# Patient Record
Sex: FEMALE | Race: WHITE | Employment: OTHER | URBAN - METROPOLITAN AREA
[De-identification: names, ages, dates, MRNs, and addresses within clinical notes are randomized per-mention and may not be internally consistent; named-entity substitution may affect disease eponyms.]

---

## 2019-12-14 ENCOUNTER — HOSPITAL ENCOUNTER (EMERGENCY)
Age: 49
Discharge: HOME | End: 2019-12-14
Attending: EMERGENCY MEDICINE
Payer: COMMERCIAL

## 2019-12-14 VITALS
HEIGHT: 60 IN | SYSTOLIC BLOOD PRESSURE: 122 MMHG | DIASTOLIC BLOOD PRESSURE: 76 MMHG | OXYGEN SATURATION: 100 % | BODY MASS INDEX: 26.5 KG/M2 | RESPIRATION RATE: 16 BRPM | TEMPERATURE: 98.5 F | WEIGHT: 135 LBS | HEART RATE: 74 BPM

## 2019-12-14 DIAGNOSIS — E87.1 HYPONATREMIA: Primary | ICD-10-CM

## 2019-12-14 LAB
ALBUMIN SERPL-MCNC: 4.1 G/DL (ref 3.5–5.2)
ALP BLD-CCNC: 50 U/L (ref 35–104)
ALT SERPL-CCNC: 21 U/L (ref 0–32)
ANION GAP SERPL CALCULATED.3IONS-SCNC: 11 MMOL/L (ref 7–16)
AST SERPL-CCNC: 26 U/L (ref 0–31)
BACTERIA: NORMAL /HPF
BASOPHILS ABSOLUTE: 0 E9/L (ref 0–0.2)
BASOPHILS RELATIVE PERCENT: 0.3 % (ref 0–2)
BILIRUB SERPL-MCNC: 0.3 MG/DL (ref 0–1.2)
BILIRUBIN URINE: NEGATIVE
BLOOD, URINE: ABNORMAL
BUN BLDV-MCNC: 7 MG/DL (ref 6–20)
CALCIUM SERPL-MCNC: 8.8 MG/DL (ref 8.6–10.2)
CHLORIDE BLD-SCNC: 93 MMOL/L (ref 98–107)
CLARITY: CLEAR
CO2: 22 MMOL/L (ref 22–29)
COLOR: ABNORMAL
CREAT SERPL-MCNC: 0.6 MG/DL (ref 0.5–1)
EOSINOPHILS ABSOLUTE: 0 E9/L (ref 0.05–0.5)
EOSINOPHILS RELATIVE PERCENT: 0.1 % (ref 0–6)
EPITHELIAL CELLS, UA: NORMAL /HPF
GFR AFRICAN AMERICAN: >60
GFR NON-AFRICAN AMERICAN: >60 ML/MIN/1.73
GLUCOSE BLD-MCNC: 108 MG/DL (ref 74–99)
GLUCOSE URINE: NEGATIVE MG/DL
HCT VFR BLD CALC: 36.5 % (ref 34–48)
HEMOGLOBIN: 12.9 G/DL (ref 11.5–15.5)
KETONES, URINE: ABNORMAL MG/DL
LEUKOCYTE ESTERASE, URINE: NEGATIVE
LYMPHOCYTES ABSOLUTE: 0.46 E9/L (ref 1.5–4)
LYMPHOCYTES RELATIVE PERCENT: 6.1 % (ref 20–42)
MCH RBC QN AUTO: 31.8 PG (ref 26–35)
MCHC RBC AUTO-ENTMCNC: 35.3 % (ref 32–34.5)
MCV RBC AUTO: 89.9 FL (ref 80–99.9)
MONOCYTES ABSOLUTE: 0.3 E9/L (ref 0.1–0.95)
MONOCYTES RELATIVE PERCENT: 3.5 % (ref 2–12)
NEUTROPHILS ABSOLUTE: 6.84 E9/L (ref 1.8–7.3)
NEUTROPHILS RELATIVE PERCENT: 90.4 % (ref 43–80)
NITRITE, URINE: NEGATIVE
PDW BLD-RTO: 11.4 FL (ref 11.5–15)
PH UA: 5.5 (ref 5–9)
PLATELET # BLD: 211 E9/L (ref 130–450)
PMV BLD AUTO: 9.5 FL (ref 7–12)
POTASSIUM SERPL-SCNC: 3.9 MMOL/L (ref 3.5–5)
PROTEIN UA: NEGATIVE MG/DL
RBC # BLD: 4.06 E12/L (ref 3.5–5.5)
RBC UA: NORMAL /HPF (ref 0–2)
SODIUM BLD-SCNC: 126 MMOL/L (ref 132–146)
SPECIFIC GRAVITY UA: <=1.005 (ref 1–1.03)
TOTAL PROTEIN: 6.4 G/DL (ref 6.4–8.3)
TROPONIN: <0.01 NG/ML (ref 0–0.03)
TSH SERPL DL<=0.05 MIU/L-ACNC: 0.87 UIU/ML (ref 0.27–4.2)
UROBILINOGEN, URINE: 0.2 E.U./DL
WBC # BLD: 7.6 E9/L (ref 4.5–11.5)
WBC UA: NORMAL /HPF (ref 0–5)

## 2019-12-14 PROCEDURE — 93005 ELECTROCARDIOGRAM TRACING: CPT | Performed by: EMERGENCY MEDICINE

## 2019-12-14 PROCEDURE — 84484 ASSAY OF TROPONIN QUANT: CPT

## 2019-12-14 PROCEDURE — 84443 ASSAY THYROID STIM HORMONE: CPT

## 2019-12-14 PROCEDURE — 80053 COMPREHEN METABOLIC PANEL: CPT

## 2019-12-14 PROCEDURE — 36415 COLL VENOUS BLD VENIPUNCTURE: CPT

## 2019-12-14 PROCEDURE — 2580000003 HC RX 258: Performed by: EMERGENCY MEDICINE

## 2019-12-14 PROCEDURE — 85025 COMPLETE CBC W/AUTO DIFF WBC: CPT

## 2019-12-14 PROCEDURE — 99284 EMERGENCY DEPT VISIT MOD MDM: CPT

## 2019-12-14 PROCEDURE — 81001 URINALYSIS AUTO W/SCOPE: CPT

## 2019-12-14 RX ORDER — 0.9 % SODIUM CHLORIDE 0.9 %
1000 INTRAVENOUS SOLUTION INTRAVENOUS ONCE
Status: COMPLETED | OUTPATIENT
Start: 2019-12-14 | End: 2019-12-14

## 2019-12-14 RX ADMIN — SODIUM CHLORIDE 1000 ML: 9 INJECTION, SOLUTION INTRAVENOUS at 12:08

## 2019-12-15 LAB
EKG ATRIAL RATE: 76 BPM
EKG P AXIS: 65 DEGREES
EKG P-R INTERVAL: 140 MS
EKG Q-T INTERVAL: 382 MS
EKG QRS DURATION: 78 MS
EKG QTC CALCULATION (BAZETT): 429 MS
EKG R AXIS: 45 DEGREES
EKG T AXIS: 39 DEGREES
EKG VENTRICULAR RATE: 76 BPM

## 2019-12-15 PROCEDURE — 93010 ELECTROCARDIOGRAM REPORT: CPT | Performed by: INTERNAL MEDICINE

## 2021-09-27 ENCOUNTER — OFFICE VISIT (OUTPATIENT)
Dept: SPORTS MEDICINE | Age: 51
End: 2021-09-27
Payer: COMMERCIAL

## 2021-09-27 VITALS — BODY MASS INDEX: 27.88 KG/M2 | TEMPERATURE: 97.5 F | WEIGHT: 142 LBS | HEIGHT: 60 IN

## 2021-09-27 DIAGNOSIS — M94.0 COSTOCHONDRITIS, ACUTE: Primary | ICD-10-CM

## 2021-09-27 DIAGNOSIS — S29.011A STRAIN OF RIGHT PECTORALIS MUSCLE, INITIAL ENCOUNTER: ICD-10-CM

## 2021-09-27 PROCEDURE — 99214 OFFICE O/P EST MOD 30 MIN: CPT | Performed by: FAMILY MEDICINE

## 2021-09-27 RX ORDER — IBUPROFEN 800 MG/1
800 TABLET ORAL
Qty: 90 TABLET | Refills: 0 | Status: SHIPPED | OUTPATIENT
Start: 2021-09-27

## 2021-09-27 ASSESSMENT — ENCOUNTER SYMPTOMS
BACK PAIN: 0
CONSTIPATION: 0
NAUSEA: 0
DIARRHEA: 0
SHORTNESS OF BREATH: 0

## 2021-09-27 NOTE — PROGRESS NOTES
[facility]) Physicians  Neurosurgery and Pain [facility]  [address] [facility] [address], [address] [facility] , [address] [facility], [place]: [phone]  F: [phone]      Tc Beavers  (1970)    9/27/2021    Subjective:     Tc Beavers is 46 y.o. female who complains today of:    Chief Complaint   Patient presents with    Other     upper right chest       HPI     Patient comes in complaining of right upper chest pain which he thinks started after doing a new exercise couple weeks ago she states is feeling about the same she has tried 200 mg ibuprofen without much help she is doing some basic exercises that are helping either  Allergies:  Amoxicillin, Azithromycin, and Penicillins    Past Medical History:   Diagnosis Date    Chronic pain      No past surgical history on file. Family History   Problem Relation Age of Onset    Arthritis Mother      Social History     Socioeconomic History    Marital status:      Spouse name: Not on file    Number of children: Not on file    Years of education: Not on file    Highest education level: Not on file   Occupational History    Not on file   Tobacco Use    Smoking status: Never Smoker    Smokeless tobacco: Never Used   Vaping Use    Vaping Use: Former   Substance and Sexual Activity    Alcohol use: Yes     Comment: social drinker    Drug use: Never    Sexual activity: Not on file   Other Topics Concern    Not on file   Social History Narrative    Not on file     Social Determinants of Health     Financial Resource Strain:     Difficulty of Paying Living Expenses:    Food Insecurity:     Worried About [address] in the Last Year:     [address] in the Last Year:    Transportation Needs:     Lack of Transportation (Medical):      Lack of Transportation (Non-Medical):    Physical Activity:     Days of Exercise per Week:     Minutes of Exercise per Session:    Stress:     Feeling of Stress :    Social Connections:  Frequency of Communication with Friends and Family:     Frequency of Social Gatherings with Friends and Family:     Attends Church Services:     Active Member of Clubs or Organizations:     Attends Club or Organization Meetings:     Marital Status:    Intimate Partner Violence:     Fear of Current or Ex-Partner:     Emotionally Abused:     Physically Abused:     Sexually Abused:        No current outpatient medications on file prior to visit. No current facility-administered medications on file prior to visit. Review of Systems   Constitutional: Negative for fever. HENT: Negative for hearing loss. Respiratory: Negative for shortness of breath. Gastrointestinal: Negative for constipation, diarrhea and nausea. Genitourinary: Negative for difficulty urinating. Musculoskeletal: Negative for back pain and neck pain. Skin: Negative for rash. Neurological: Negative for headaches. Hematological: Does not bruise/bleed easily. Psychiatric/Behavioral: Negative for sleep disturbance. Objective:     Vitals:  Temp 97.5 °F (36.4 °C) (Infrared)   Ht 5' (1.524 m)   Wt 142 lb (64.4 kg)   BMI 27.73 kg/m²        Physical Exam  Vitals reviewed. Constitutional:       Appearance: Normal appearance. Skin:     General: Skin is warm and dry. Neurological:      Mental Status: She is alert. Ortho Exam   Examination of the right upper chest and shoulder area revealed the neurovascular muscular status to be intact there is some mild pain with activation of the subscapularis as well as the pectoralis major the rotator cuff is intact not not appear to be any muscle asymmetry the neurovascular muscular status is intact tenderness along the costosternal border    Assessment:      Diagnosis Orders   1. Costochondritis, acute  Ambulatory referral to Physical Therapy    ibuprofen (ADVIL;MOTRIN) 800 MG tablet   2.  Strain of right pectoralis muscle, initial encounter  Ambulatory referral to Physical Therapy    ibuprofen (ADVIL;MOTRIN) 800 MG tablet       Plan:   Patient states she is sent for evaluation treatment was started on PT gave her a prescription for 800 mg of ibuprofen use 3 times daily with food see her back in 3 weeks he is no better consider x-rays and further testing       Orders Placed This Encounter   Medications    ibuprofen (ADVIL;MOTRIN) 800 MG tablet     Sig: Take 1 tablet by mouth 3 times daily (with meals)     Dispense:  90 tablet     Refill:  0       Orders Placed This Encounter   Procedures    Ambulatory referral to Physical Therapy     Referral Priority:   Routine     Referral Type:   Physical Medicine     Referral Reason:   Patient Preference     Requested Specialty:   Physical Therapy     Number of Visits Requested:   1         Follow up:  Return in about 3 weeks (around 10/18/2021).     LATANYA AGUIRRE, DO

## 2021-10-04 ENCOUNTER — TELEPHONE (OUTPATIENT)
Dept: PAIN MANAGEMENT | Age: 51
End: 2021-10-04

## 2021-10-04 DIAGNOSIS — S29.011A STRAIN OF RIGHT PECTORALIS MUSCLE, INITIAL ENCOUNTER: ICD-10-CM

## 2021-10-04 DIAGNOSIS — M94.0 COSTOCHONDRITIS, ACUTE: Primary | ICD-10-CM

## 2021-10-04 NOTE — TELEPHONE ENCOUNTER
Patient informed. I gave her the phone number for [facility] Scheduling: [phone], to make appointment to get her XR. Patient will call back to schedule follow up with Dr Kayli Brar after the XR is completed.

## 2021-10-04 NOTE — TELEPHONE ENCOUNTER
Patient called stating that since the initial onset 3 weeks ago, the pain in her chest \"isn't getting worse but it isn't getting better either\". She is asking Dr. Jean Whitten if she can take a higher mg ibuprofen and also if Dr should go ahead and order an XR to see if it's something more than just the joint? If so, would a XR be better than a CT scan? Patient says that she starts PT tomorrow but isn't comfortable waiting 2 weeks if there may possibly be \"a growth\" that could be causing the pain that she is experiencing?

## 2021-10-08 ENCOUNTER — OFFICE VISIT (OUTPATIENT)
Dept: SPORTS MEDICINE | Age: 51
End: 2021-10-08
Payer: COMMERCIAL

## 2021-10-08 VITALS — WEIGHT: 142 LBS | HEIGHT: 60 IN | BODY MASS INDEX: 27.88 KG/M2 | TEMPERATURE: 97.8 F

## 2021-10-08 DIAGNOSIS — M94.0 COSTOCHONDRITIS, ACUTE: ICD-10-CM

## 2021-10-08 DIAGNOSIS — S29.011A STRAIN OF RIGHT PECTORALIS MUSCLE, INITIAL ENCOUNTER: ICD-10-CM

## 2021-10-08 DIAGNOSIS — M47.814 SPONDYLOSIS OF THORACIC REGION WITHOUT MYELOPATHY OR RADICULOPATHY: ICD-10-CM

## 2021-10-08 DIAGNOSIS — M94.0 COSTOCHONDRITIS, ACUTE: Primary | ICD-10-CM

## 2021-10-08 PROCEDURE — 99214 OFFICE O/P EST MOD 30 MIN: CPT | Performed by: FAMILY MEDICINE

## 2021-10-08 ASSESSMENT — ENCOUNTER SYMPTOMS
CONSTIPATION: 0
BACK PAIN: 0
SHORTNESS OF BREATH: 0
DIARRHEA: 0
NAUSEA: 0

## 2021-10-08 NOTE — PROGRESS NOTES
[facility] ([facility]) Physicians  Neurosurgery and Pain [facility]  [address], [address] [facility] , [address] [facility], [place]: [phone]  F: [phone]      Slim Fuller  (1970)    10/8/2021    Subjective:     Slim Fuller is 46 y.o. female who complains today of:    Chief Complaint   Patient presents with    Other     [address]        HPI     Patient returns stating that therapy is helping her somewhat but still having pain and also getting some pain in the mid back region  Allergies:  Amoxicillin, Azithromycin, and Penicillins    Past Medical History:   Diagnosis Date    Chronic pain      History reviewed. No pertinent surgical history. Family History   Problem Relation Age of Onset    Arthritis Mother      Social History     Socioeconomic History    Marital status:      Spouse name: Not on file    Number of children: Not on file    Years of education: Not on file    Highest education level: Not on file   Occupational History    Not on file   Tobacco Use    Smoking status: Never Smoker    Smokeless tobacco: Never Used   Vaping Use    Vaping Use: Former   Substance and Sexual Activity    Alcohol use: Yes     Comment: social drinker    Drug use: Never    Sexual activity: Not on file   Other Topics Concern    Not on file   Social History Narrative    Not on file     Social Determinants of Health     Financial Resource Strain:     Difficulty of Paying Living Expenses:    Food Insecurity:     Worried About [address] in the Last Year:     [address] in the Last Year:    Transportation Needs:     Lack of Transportation (Medical):      Lack of Transportation (Non-Medical):    Physical Activity:     Days of Exercise per Week:     Minutes of Exercise per Session:    Stress:     Feeling of Stress :    Social Connections:     Frequency of Communication with Friends and Family:     Frequency of Social Gatherings with Friends and Family:     Attends Mormon Services:     Active Member of Clubs or Organizations:     Attends Club or Organization Meetings:     Marital Status:    Intimate Partner Violence:     Fear of Current or Ex-Partner:     Emotionally Abused:     Physically Abused:     Sexually Abused:        Current Outpatient Medications on File Prior to Visit   Medication Sig Dispense Refill    ibuprofen (ADVIL;MOTRIN) 800 MG tablet Take 1 tablet by mouth 3 times daily (with meals) 90 tablet 0     No current facility-administered medications on file prior to visit. Review of Systems   Constitutional: Negative for fever. HENT: Negative for hearing loss. Respiratory: Negative for shortness of breath. Gastrointestinal: Negative for constipation, diarrhea and nausea. Genitourinary: Negative for difficulty urinating. Musculoskeletal: Negative for back pain and neck pain. Skin: Negative for rash. Neurological: Negative for headaches. Hematological: Does not bruise/bleed easily. Psychiatric/Behavioral: Negative for sleep disturbance. Objective:     Vitals:  Temp 97.8 °F (36.6 °C) (Infrared)   Ht 5' (1.524 m)   Wt 142 lb (64.4 kg)   BMI 27.73 kg/m² Pain Score:   7      Physical Exam  Vitals reviewed. Constitutional:       Appearance: Normal appearance. Skin:     General: Skin is warm and dry. Neurological:      Mental Status: She is alert. Ortho Exam   Examination of the thoracic spine revealed the neurovascular muscular status to be intact some mild tenderness in the mid thoracic region no pain with rotation no pain with activation of the associated musculature  Examination of the right ribs also some tenderness around the T2-4 region as it joins the sternum the neurovascular muscular status is intact    Assessment:      Diagnosis Orders   1. Costochondritis, acute     2.  Spondylosis of thoracic region without myelopathy or radiculopathy         Plan:   Patient is slowly improving and I think some of the issues she had in her upper back is from muscular changes that she is had in therapy want her to to continue with therapy see her back in 3 weeks if no better consider further testing       No orders of the defined types were placed in this encounter. No orders of the defined types were placed in this encounter. Follow up:  No follow-ups on file.     LATANYA AGUIRRE, DO

## 2022-01-13 ENCOUNTER — TELEPHONE (OUTPATIENT)
Dept: PAIN MANAGEMENT | Age: 52
End: 2022-01-13

## 2022-01-13 ENCOUNTER — OFFICE VISIT (OUTPATIENT)
Dept: SPORTS MEDICINE | Age: 52
End: 2022-01-13
Payer: COMMERCIAL

## 2022-01-13 VITALS — WEIGHT: 142 LBS | BODY MASS INDEX: 27.88 KG/M2 | TEMPERATURE: 96.7 F | HEIGHT: 60 IN

## 2022-01-13 DIAGNOSIS — R07.89 COSTOCHONDRAL PAIN: Primary | ICD-10-CM

## 2022-01-13 PROCEDURE — 99213 OFFICE O/P EST LOW 20 MIN: CPT | Performed by: FAMILY MEDICINE

## 2022-01-13 ASSESSMENT — ENCOUNTER SYMPTOMS
DIARRHEA: 0
NAUSEA: 0
CONSTIPATION: 0
BACK PAIN: 0
SHORTNESS OF BREATH: 0

## 2022-01-13 NOTE — TELEPHONE ENCOUNTER
Patient states she would like to get MRI done at [address] [place] [address].  Phone #[phone]

## 2022-01-13 NOTE — PROGRESS NOTES
[facility] Physicians  Neurosurgery and Pain [facility]  [address] [facility], [address] [facility] , [address] [facility], [place]: [phone]  F: [phone]      Rommel Spangler  (1970)    1/13/2022    Subjective:     Rommel Spangler is 46 y.o. female who complains today of:    Chief Complaint   Patient presents with    Follow-up     Costochondritis - Right Pectoralis Strain       HPI     Patient comes in stating that her chest is still hurting her on the right so she has been going to therapy for months and has had a little bit of relief but then the pain returns she been taking ibuprofen as well as Naprosyn and it has not helped at all she is wondering what her other options are at this point  Allergies:  Amoxicillin, Azithromycin, and Penicillins    Past Medical History:   Diagnosis Date    Chronic pain      No past surgical history on file. Family History   Problem Relation Age of Onset    Arthritis Mother      Social History     Socioeconomic History    Marital status:      Spouse name: Not on file    Number of children: Not on file    Years of education: Not on file    Highest education level: Not on file   Occupational History    Not on file   Tobacco Use    Smoking status: Never Smoker    Smokeless tobacco: Never Used   Vaping Use    Vaping Use: Former   Substance and Sexual Activity    Alcohol use: Yes     Comment: social drinker    Drug use: Never    Sexual activity: Not on file   Other Topics Concern    Not on file   Social History Narrative    Not on file     Social Determinants of Health     Financial Resource Strain:     Difficulty of Paying Living Expenses: Not on file   Food Insecurity:     Worried About [address] in the Last Year: Not on file    [address] in the Last Year: Not on file   Transportation Needs:     Lack of Transportation (Medical): Not on file    Lack of Transportation (Non-Medical):  Not on file   Physical Activity: is alert. Ortho Exam   Examination of the right chest region reveals tenderness mostly over the second costochondral region there is pain with distraction and compression in the area there is some fullness and edema noted there rotator cuff appears to be intact cervical spine shows near full range of motion with no associated pain    Assessment:      Diagnosis Orders   1. Costochondral pain  MRI CHEST WO CONTRAST    Ambulatory referral to Physical Therapy       Plan: This patient has failed conservative care including months of physical therapy as well as 2 different anti-inflammatories therefore I want to get an MRI to better delineate her pathology we will see her back with MRI results       No orders of the defined types were placed in this encounter. Orders Placed This Encounter   Procedures    MRI CHEST WO CONTRAST     Standing Status:   Future     Standing Expiration Date:   1/13/2023    Ambulatory referral to Physical Therapy     Referral Priority:   Routine     Referral Type:   Physical Medicine     Requested Specialty:   Physical Therapy     Number of Visits Requested:   1         Follow up:  Return for mri results.     LATANYA AGUIRRE DO

## 2022-01-14 ENCOUNTER — TELEPHONE (OUTPATIENT)
Dept: SPORTS MEDICINE | Age: 52
End: 2022-01-14

## 2022-01-14 NOTE — TELEPHONE ENCOUNTER
Patient's insurance will only allow one choice, which of the following is the main reason for ordering MRI Chest?      Pectoralis muscle tear or Chest wall deformity

## 2022-01-17 NOTE — TELEPHONE ENCOUNTER
MRI Chest w/out contrast authorization request submitted online ([facility] Provider Portal), auth pending. Order # [id number]      MRI to be done at [facility].

## 2022-01-18 NOTE — TELEPHONE ENCOUNTER
MRI Chest w/out contrast order along w/auth letter faxed to [facility] ([phone], [phone]), they will call patient to schedule.       Order ID #[id number]   1- to 2-

## 2022-01-18 NOTE — TELEPHONE ENCOUNTER
MRI Chest w/out contrast authorization request submitted online ([facility] [employer] Provider Portal), auth pending.    Order # [id number]

## 2022-01-28 ENCOUNTER — TELEPHONE (OUTPATIENT)
Dept: SPORTS MEDICINE | Age: 52
End: 2022-01-28

## 2022-01-28 NOTE — TELEPHONE ENCOUNTER
Dr Luci Yan [phone] is requesting a phone call back from Dr Ashley Marsh regarding reason for MRI for patient.  states that this is a semi urgent matter.

## 2022-01-28 NOTE — TELEPHONE ENCOUNTER
Patient called regarding her MRI. She says that she does not understand why she is being sent for a cardiology MRI and not a regular chest MRI. She is asking if someone can call her back to explain? She says that she has cancelled her MRI for now.

## 2022-01-28 NOTE — TELEPHONE ENCOUNTER
I spoke with patient on the phone and she states she wants to go to an open MRI so we can please set her up with that thank you

## 2022-01-28 NOTE — TELEPHONE ENCOUNTER
Called pt to make aware of Dr. Darryn Vickers' response. She says that she spoke to [facility] and was told that the diagnosis code was related to cardiology and that is why they are wanting do a cardiology MRI. She is asking if Dr. Darryn Vickers will call her back to discuss.

## 2022-01-28 NOTE — TELEPHONE ENCOUNTER
Please tell the patient that we do not understand either since I specifically talked to the doctor before the test and told him that I was looking for a muscle tear and possibly something in the bone she will have to take this up with the people who are doing the MRI or we need to take her to a different place

## 2022-01-31 NOTE — TELEPHONE ENCOUNTER
Patient states that [facility] was not able to schedule her MRI due to what kind of MRI she needs. She is asking for assistance in scheduling this.

## 2022-02-02 NOTE — TELEPHONE ENCOUNTER
Patient would like office to call and schedule MRI with the order.     Will call [address] [facility]  For chest MRI

## 2022-02-02 NOTE — TELEPHONE ENCOUNTER
[facility] called and they received the order for the MRI. Dr Geraldine Michaels just wanted to double check that he understands it correctly. He's asking if it's a pectoralis protocol? He stated that he doesn't receive these specifically too often so he wants to make sure he gets the proper area of the chest in the MRI. Will Dr Ballesteros Argue please advise or give Dr Geraldine Michaels a quick call @[phone]? Thank you. Martha Correa

## 2022-02-15 ENCOUNTER — OFFICE VISIT (OUTPATIENT)
Dept: SPORTS MEDICINE | Age: 52
End: 2022-02-15
Payer: COMMERCIAL

## 2022-02-15 VITALS — WEIGHT: 142 LBS | BODY MASS INDEX: 27.88 KG/M2 | TEMPERATURE: 97.1 F | HEIGHT: 60 IN

## 2022-02-15 DIAGNOSIS — R07.89 COSTOCHONDRAL PAIN: Primary | ICD-10-CM

## 2022-02-15 DIAGNOSIS — M47.814 SPONDYLOSIS OF THORACIC REGION WITHOUT MYELOPATHY OR RADICULOPATHY: ICD-10-CM

## 2022-02-15 PROCEDURE — 99213 OFFICE O/P EST LOW 20 MIN: CPT | Performed by: FAMILY MEDICINE

## 2022-02-15 ASSESSMENT — ENCOUNTER SYMPTOMS
DIARRHEA: 0
SHORTNESS OF BREATH: 0
BACK PAIN: 0
NAUSEA: 0
CONSTIPATION: 0

## 2022-02-15 NOTE — PROGRESS NOTES
[facility]) Physicians  Neurosurgery and Pain [facility]  [address] [facility], [address] [facility] , [address] [facility]: [phone]  F: [phone]      Jess Thomas  (1970)    2/15/2022    Subjective:     Jess Thomas is 46 y.o. female who complains today of:    Chief Complaint   Patient presents with    Follow-up     Chest MRI       HPI     Patient returns stating that she is starting to feel little bit better she is getting some back issues also but she is working through with them with the therapist she has had her MRI done  Allergies:  Amoxicillin, Azithromycin, and Penicillins    Past Medical History:   Diagnosis Date    Chronic pain      No past surgical history on file. Family History   Problem Relation Age of Onset    Arthritis Mother      Social History     Socioeconomic History    Marital status:      Spouse name: Not on file    Number of children: Not on file    Years of education: Not on file    Highest education level: Not on file   Occupational History    Not on file   Tobacco Use    Smoking status: Never Smoker    Smokeless tobacco: Never Used   Vaping Use    Vaping Use: Former   Substance and Sexual Activity    Alcohol use: Yes     Comment: social drinker    Drug use: Never    Sexual activity: Not on file   Other Topics Concern    Not on file   Social History Narrative    Not on file     Social Determinants of Health     Financial Resource Strain:     Difficulty of Paying Living Expenses: Not on file   Food Insecurity:     Worried About [address] in the Last Year: Not on file    [address] in the Last Year: Not on file   Transportation Needs:     Lack of Transportation (Medical): Not on file    Lack of Transportation (Non-Medical):  Not on file   Physical Activity:     Days of Exercise per Week: Not on file    Minutes of Exercise per Session: Not on file   Stress:     Feeling of Stress : Not on file   Social Connections:     Frequency of Communication with Friends and Family: Not on file    Frequency of Social Gatherings with Friends and Family: Not on file    Attends Orthodoxy Services: Not on file    Active Member of Clubs or Organizations: Not on file    Attends Club or Organization Meetings: Not on file    Marital Status: Not on file   Intimate Partner Violence:     Fear of Current or Ex-Partner: Not on file    Emotionally Abused: Not on file    Physically Abused: Not on file    Sexually Abused: Not on file   Housing Stability:     Unable to Pay for Housing in the Last Year: Not on file    Number of [place] in the Last Year: Not on file    Unstable Housing in the Last Year: Not on file       Current Outpatient Medications on File Prior to Visit   Medication Sig Dispense Refill    ibuprofen (ADVIL;MOTRIN) 800 MG tablet Take 1 tablet by mouth 3 times daily (with meals) 90 tablet 0     No current facility-administered medications on file prior to visit. Review of Systems   Constitutional: Negative for fever. HENT: Negative for hearing loss. Respiratory: Negative for shortness of breath. Gastrointestinal: Negative for constipation, diarrhea and nausea. Genitourinary: Negative for difficulty urinating. Musculoskeletal: Negative for back pain and neck pain. Skin: Negative for rash. Neurological: Negative for headaches. Hematological: Does not bruise/bleed easily. Psychiatric/Behavioral: Negative for sleep disturbance. Objective:     Vitals:  Temp 97.1 °F (36.2 °C) (Infrared)   Ht 5' (1.524 m)   Wt 142 lb (64.4 kg)   BMI 27.73 kg/m² Pain Score:   3      Physical Exam  Vitals reviewed. Constitutional:       Appearance: Normal appearance. Skin:     General: Skin is warm and dry. Neurological:      Mental Status: She is alert.          Ortho Exam   Examination of the thoracic spine reveals the neurovascular muscular status to be intact minimal palpable tenderness anteriorly there is some tenderness noted near the scapular border on the left there is good range of motion of shoulders rotator cuff intact    Assessment:      Diagnosis Orders   1. Costochondral pain     2. Spondylosis of thoracic region without myelopathy or radiculopathy         Plan:   I think this is all in continuation of her anterior chest pain that is caused muscle spasms and in the back she is working with the therapist I want her continue to do so if she still having issues in 4 weeks on to see her back and consider medication and further diagnostics       No orders of the defined types were placed in this encounter. No orders of the defined types were placed in this encounter. Follow up:  Return in about 4 weeks (around 3/15/2022).     LATANYA AGUIRRE, DO

## 2022-02-23 DIAGNOSIS — R07.89 COSTOCHONDRAL PAIN: ICD-10-CM

## 2022-02-25 ENCOUNTER — TELEPHONE (OUTPATIENT)
Dept: SPORTS MEDICINE | Age: 52
End: 2022-02-25

## 2022-02-25 NOTE — TELEPHONE ENCOUNTER
Patient called stating that her physical therapist was wondering if Dr. Edin Kat would order an Xray of her neck to see if there is something there that is causing her left hand to go numb

## 2022-02-28 NOTE — TELEPHONE ENCOUNTER
Please see if she can come to the office either Tuesday or Thursday and I will just put in an x-ray for her that day  We do not need to put her in for an appointment with me unless she wants it

## 2023-03-09 ENCOUNTER — OFFICE VISIT (OUTPATIENT)
Dept: SPORTS MEDICINE | Age: 53
End: 2023-03-09
Payer: COMMERCIAL

## 2023-03-09 VITALS
BODY MASS INDEX: 25.91 KG/M2 | DIASTOLIC BLOOD PRESSURE: 68 MMHG | HEIGHT: 60 IN | SYSTOLIC BLOOD PRESSURE: 116 MMHG | WEIGHT: 132 LBS | TEMPERATURE: 97.1 F

## 2023-03-09 DIAGNOSIS — M19.032 PRIMARY OSTEOARTHRITIS OF LEFT WRIST: ICD-10-CM

## 2023-03-09 DIAGNOSIS — M47.22 OSTEOARTHRITIS OF SPINE WITH RADICULOPATHY, CERVICAL REGION: Primary | ICD-10-CM

## 2023-03-09 PROCEDURE — 99214 OFFICE O/P EST MOD 30 MIN: CPT | Performed by: FAMILY MEDICINE

## 2023-03-09 ASSESSMENT — ENCOUNTER SYMPTOMS
BACK PAIN: 0
NAUSEA: 0
CONSTIPATION: 0
SHORTNESS OF BREATH: 0
DIARRHEA: 0

## 2023-03-09 NOTE — PROGRESS NOTES
[facility]) Physicians  Neurosurgery and Pain [facility]  [address], [address] [facility] , [address] [facility], [place]: [phone]  F: [phone]      Deidra Dudley  (1970)    3/9/2023    Subjective:     Deidra Dudley is 46 y.o. female who complains today of:    Chief Complaint   Patient presents with    Wrist Pain     Left    Shoulder Pain     Left    Back Pain       HPI     This patient comes in complaining of a few weeks of neck pain radiating down the arm as well as left wrist pain she says she is think she is noticed it more since she started doing a lot more typing and she is being not doing all the exercises she usually does she is taken some ibuprofen which helps but she is wondering what else she can do to help the issue  Allergies:  Amoxicillin, Azithromycin, and Penicillins    Past Medical History:   Diagnosis Date    Chronic pain      History reviewed. No pertinent surgical history.   Family History   Problem Relation Age of Onset    Arthritis Mother      Social History     Socioeconomic History    Marital status:      Spouse name: Not on file    Number of children: Not on file    Years of education: Not on file    Highest education level: Not on file   Occupational History    Not on file   Tobacco Use    Smoking status: Never    Smokeless tobacco: Never   Vaping Use    Vaping Use: Former   Substance and Sexual Activity    Alcohol use: Yes     Comment: social drinker    Drug use: Never    Sexual activity: Not on file   Other Topics Concern    Not on file   Social History Narrative    Not on file     Social Determinants of Health     Financial Resource Strain: Not on file   Food Insecurity: Not on file   Transportation Needs: Not on file   Physical Activity: Not on file   Stress: Not on file   Social Connections: Not on file   Intimate Partner Violence: Not on file   Housing Stability: Not on file       Current Outpatient Medications on File Prior to Visit Medication Sig Dispense Refill    ibuprofen (ADVIL;MOTRIN) 800 MG tablet Take 1 tablet by mouth 3 times daily (with meals) (Patient taking differently: Take 800 mg by mouth in the morning, at noon, in the evening, and at bedtime) 90 tablet 0     No current facility-administered medications on file prior to visit. Review of Systems   Constitutional:  Negative for fever. HENT:  Negative for hearing loss. Respiratory:  Negative for shortness of breath. Gastrointestinal:  Negative for constipation, diarrhea and nausea. Genitourinary:  Negative for difficulty urinating. Musculoskeletal:  Negative for back pain and neck pain. Skin:  Negative for rash. Neurological:  Negative for headaches. Hematological:  Does not bruise/bleed easily. Psychiatric/Behavioral:  Negative for sleep disturbance. Objective:     Vitals:  /68   Temp 97.1 °F (36.2 °C)   Ht 5' (1.524 m)   Wt 132 lb (59.9 kg)   BMI 25.78 kg/m² Pain Score:   7      Physical Exam  Vitals reviewed. Constitutional:       Appearance: Normal appearance. Skin:     General: Skin is warm and dry. Neurological:      Mental Status: She is alert. Ortho Exam   Examination of the cervical spine with neurovascular muscle status to be intact near full range of motion equivocal Spurling sign to the left tenderness along the left paracervicals extending down the medial border of the scapula  Examination of the left wrist reveals mild edema range of motion only about 45 degrees of dorsi and palmar flexion equivocal Tinel's neurovascular status is intact ligaments and tendons appear to be intact    I reviewed x-rays of the cervical spine done today  Reviewed x-rays of the left wrist done today    Assessment:      Diagnosis Orders   1. Osteoarthritis of spine with radiculopathy, cervical region  XR CERVICAL SPINE (2-3 VIEWS)    Ambulatory referral to Physical Therapy      2.  Primary osteoarthritis of left wrist  XR WRIST LEFT (MIN 3 VIEWS)    Ambulatory referral to Physical Therapy          Plan:   Patient states she was sent for evaluation and treatment I prescribed a ibuprofen regimen for her I also was started on physical therapy and see her back in 4 weeks if she is no better consider further testing I also made suggestions for changes in her workspace to accommodate these issues       No orders of the defined types were placed in this encounter. Orders Placed This Encounter   Procedures    XR WRIST LEFT (MIN 3 VIEWS)     Standing Status:   Future     Number of Occurrences:   1     Standing Expiration Date:   3/9/2024    XR CERVICAL SPINE (2-3 VIEWS)     Standing Status:   Future     Number of Occurrences:   1     Standing Expiration Date:   6/7/2023     Scheduling Instructions:      XR Cervical Spine AP and Lateral    Ambulatory referral to Physical Therapy     Referral Priority:   Routine     Referral Type:   Eval and Treat     Referral Reason:   Specialty Services Required     Requested Specialty:   Physical Therapist     Number of Visits Requested:   1         Follow up:  Return in about 4 weeks (around 4/6/2023).     LATANYA AGUIRRE DO

## 2023-03-21 ENCOUNTER — TELEPHONE (OUTPATIENT)
Dept: PRIMARY CARE | Facility: CLINIC | Age: 53
End: 2023-03-21

## 2023-03-21 NOTE — TELEPHONE ENCOUNTER
Pt called and said that she was recently sick and now she is still coughing. Pt states that some times she dry coughs and some times she's coughing up yellow mucus and she would like to know what could be done.  Pharmacy is updated and pt states she is allergic to Z-travis, amoxicillin and penicillin. Pt also states that the Doxycycline didn't help at all when she took it.

## 2023-08-29 ENCOUNTER — OFFICE VISIT (OUTPATIENT)
Dept: SPORTS MEDICINE | Age: 53
End: 2023-08-29
Payer: COMMERCIAL

## 2023-08-29 VITALS — HEIGHT: 60 IN | TEMPERATURE: 96.6 F | WEIGHT: 137 LBS | BODY MASS INDEX: 26.9 KG/M2

## 2023-08-29 DIAGNOSIS — M65.311 TRIGGER THUMB OF RIGHT HAND: Primary | ICD-10-CM

## 2023-08-29 PROCEDURE — 99214 OFFICE O/P EST MOD 30 MIN: CPT | Performed by: FAMILY MEDICINE

## 2023-08-29 ASSESSMENT — ENCOUNTER SYMPTOMS
CONSTIPATION: 0
BACK PAIN: 0
NAUSEA: 0
DIARRHEA: 0
SHORTNESS OF BREATH: 0

## 2023-08-29 NOTE — PROGRESS NOTES
[facility] ([facility]) Physicians  Neurosurgery and Pain [facility]  [address] , [address] [address] [facility], [address] [place], [place] [address]vard: [phone]  F: [phone]      Agusto Navarro  (1970)    8/29/2023    Subjective:     Agusto Navarro is 46 y.o. female who complains today of:    Chief Complaint   Patient presents with    Hand Pain       HPI     This patient comes in complaining of right thumb pain over the last few months says over the last few weeks is gotten worse says she does a lot of typing and she is wonder if this has any do with it she does not feel any locking but feels like very odd for her to move it and feels very tight [place] any associated paresthesias  Allergies:  Amoxicillin, Azithromycin, and Penicillins    Past Medical History:   Diagnosis Date    Chronic pain      History reviewed. No pertinent surgical history.   Family History   Problem Relation Age of Onset    Arthritis Mother      Social History     Socioeconomic History    Marital status:      Spouse name: Not on file    Number of children: Not on file    Years of education: Not on file    Highest education level: Not on file   Occupational History    Not on file   Tobacco Use    Smoking status: Never    Smokeless tobacco: Never   Vaping Use    Vaping Use: Former   Substance and Sexual Activity    Alcohol use: Yes     Comment: social drinker    Drug use: Never    Sexual activity: Not on file   Other Topics Concern    Not on file   Social History Narrative    Not on file     Social Determinants of Health     Financial Resource Strain: Not on file   Food Insecurity: Not on file   Transportation Needs: Not on file   Physical Activity: Not on file   Stress: Not on file   Social Connections: Not on file   Intimate Partner Violence: Not on file   Housing Stability: Not on file       Current Outpatient Medications on File Prior to Visit   Medication Sig Dispense Refill    ibuprofen (ADVIL;MOTRIN) 800 MG tablet Take 1 tablet

## 2023-09-21 ENCOUNTER — TELEPHONE (OUTPATIENT)
Dept: SPORTS MEDICINE | Age: 53
End: 2023-09-21

## 2023-09-21 DIAGNOSIS — M47.22 OSTEOARTHRITIS OF SPINE WITH RADICULOPATHY, CERVICAL REGION: Primary | ICD-10-CM

## 2023-09-21 DIAGNOSIS — S29.011A STRAIN OF RIGHT PECTORALIS MUSCLE, INITIAL ENCOUNTER: ICD-10-CM

## 2023-09-21 DIAGNOSIS — M94.0 COSTOCHONDRITIS, ACUTE: ICD-10-CM

## 2023-09-21 RX ORDER — IBUPROFEN 800 MG/1
800 TABLET ORAL
Qty: 90 TABLET | Refills: 0 | Status: SHIPPED | OUTPATIENT
Start: 2023-09-21

## 2023-09-21 NOTE — TELEPHONE ENCOUNTER
Patient called asking if Dr Aurora Hahn would send an Rx for motrin? She says that she has been taking the 600mg and it helps a little but she is wondering if Dr Aurora Hahn would write her for 800mg? She says buying it OTC has become costly. She is also asking how long she needs to take this to see results?

## 2023-10-27 DIAGNOSIS — S29.011A STRAIN OF RIGHT PECTORALIS MUSCLE, INITIAL ENCOUNTER: ICD-10-CM

## 2023-10-27 DIAGNOSIS — M94.0 COSTOCHONDRITIS, ACUTE: ICD-10-CM

## 2023-10-27 RX ORDER — IBUPROFEN 800 MG/1
800 TABLET ORAL
Qty: 90 TABLET | Refills: 0 | Status: SHIPPED | OUTPATIENT
Start: 2023-10-27

## 2023-10-27 NOTE — TELEPHONE ENCOUNTER
You had prescribed her ibuprofen for her trigger thumb she's working with physical therapy. Can you send her another prescription? She's scheduled to see you next Thursday, 11/2/23 for f/u. Please call her to let her know if her rx was sent to her pharmacy.         Requested Prescriptions     Pending Prescriptions Disp Refills    ibuprofen (ADVIL;MOTRIN) 800 MG tablet 90 tablet 0     Sig: Take 1 tablet by mouth 3 times daily (with meals)       Patient last seen on:  8/29/23  Date of last surgery:  n/a  Date of last refill:  9/21/23  Pain level:  n/a   Patient complaining of:  n/a  Future appts: 11/2/23